# Patient Record
(demographics unavailable — no encounter records)

---

## 2024-10-17 NOTE — PHYSICAL EXAM
[General Appearance - Well Developed] : well developed [Normal Appearance] : normal appearance [Edema] : no peripheral edema [] : no respiratory distress [Abdomen Soft] : soft [Urinary Bladder Findings] : the bladder was normal on palpation [de-identified] : HEIDE 4 x4 smooth [Chaperone Present] : A chaperone was present in the examining room during all aspects of the physical examination [FreeTextEntry2] : My muñoz MA

## 2024-10-17 NOTE — HISTORY OF PRESENT ILLNESS
[FreeTextEntry1] : 62 yo patient with a pertinent past medical history of HL who presents to clinic today with complaints of LUTS: one incident where he was incontinent while jogging. he has severe urgency on urination and frequency.  frequency and urgency is the most bothersome symptom.   Quality of his stream is strong.   Fam hx: ++ PCa in 2 uncles Bowel Movements are regular. Denies history of colonic diverticulum/diverticulitis.   No  surgical Hx including Uroflow, UDS, Cystoscopy. Denies past UTI Hx, denies fever, chills, sweats, flank pain.  Denies history of kidney stones or bladder stones.  No Neurologic Hx.  No Hx of DM   IPSS: 6 mostly due to urgency office PVR = 0 cc

## 2024-12-17 NOTE — PHYSICAL EXAM
[General Appearance - Well Developed] : well developed [Normal Appearance] : normal appearance [Edema] : no peripheral edema [] : no respiratory distress [Abdomen Soft] : soft [Urinary Bladder Findings] : the bladder was normal on palpation [de-identified] : HEIDE 4 x4 smooth [Chaperone Present] : A chaperone was present in the examining room during all aspects of the physical examination [FreeTextEntry2] : My muñoz MA

## 2024-12-17 NOTE — HISTORY OF PRESENT ILLNESS
[FreeTextEntry1] : 62 yo patient with a pertinent past medical history of HL who presents to clinic today with complaints of LUTS: one incident where he was incontinent while jogging. he has severe urgency on urination and frequency.  frequency and urgency is the most bothersome symptom.   Quality of his stream is strong.  Fam hx: ++ PCa in 2 uncles IPSS: 6 mostly due to urgency office PVR = 0 cc  dec 2024: PSA= 0.6 // US pelvis:  sx on oxybutinin 10 mg ER: